# Patient Record
Sex: FEMALE | Race: WHITE | Employment: FULL TIME | URBAN - METROPOLITAN AREA
[De-identification: names, ages, dates, MRNs, and addresses within clinical notes are randomized per-mention and may not be internally consistent; named-entity substitution may affect disease eponyms.]

---

## 2018-05-21 PROBLEM — K21.00 GASTROESOPHAGEAL REFLUX DISEASE WITH ESOPHAGITIS: Status: ACTIVE | Noted: 2018-05-21

## 2020-07-30 ENCOUNTER — OFFICE VISIT (OUTPATIENT)
Dept: SURGERY | Facility: CLINIC | Age: 65
End: 2020-07-30
Payer: COMMERCIAL

## 2020-07-30 VITALS
SYSTOLIC BLOOD PRESSURE: 124 MMHG | HEIGHT: 69 IN | WEIGHT: 293 LBS | DIASTOLIC BLOOD PRESSURE: 76 MMHG | BODY MASS INDEX: 43.4 KG/M2 | HEART RATE: 72 BPM

## 2020-07-30 DIAGNOSIS — R73.03 PREDIABETES: ICD-10-CM

## 2020-07-30 DIAGNOSIS — R63.5 WEIGHT GAIN: Primary | ICD-10-CM

## 2020-07-30 DIAGNOSIS — R60.0 BILATERAL LOWER EXTREMITY EDEMA: ICD-10-CM

## 2020-07-30 DIAGNOSIS — F32.5 DEPRESSION, MAJOR, IN REMISSION (HCC): ICD-10-CM

## 2020-07-30 DIAGNOSIS — K21.00 GASTROESOPHAGEAL REFLUX DISEASE WITH ESOPHAGITIS: ICD-10-CM

## 2020-07-30 DIAGNOSIS — Z86.69 HX OF MIGRAINE HEADACHES: ICD-10-CM

## 2020-07-30 DIAGNOSIS — E66.01 MORBID OBESITY WITH BMI OF 45.0-49.9, ADULT (HCC): ICD-10-CM

## 2020-07-30 PROCEDURE — 3008F BODY MASS INDEX DOCD: CPT | Performed by: NURSE PRACTITIONER

## 2020-07-30 PROCEDURE — 3078F DIAST BP <80 MM HG: CPT | Performed by: NURSE PRACTITIONER

## 2020-07-30 PROCEDURE — 99204 OFFICE O/P NEW MOD 45 MIN: CPT | Performed by: NURSE PRACTITIONER

## 2020-07-30 PROCEDURE — 3074F SYST BP LT 130 MM HG: CPT | Performed by: NURSE PRACTITIONER

## 2020-07-30 RX ORDER — HYDROCHLOROTHIAZIDE 12.5 MG/1
12.5 TABLET ORAL DAILY
Qty: 30 TABLET | Refills: 1 | Status: SHIPPED | OUTPATIENT
Start: 2020-07-30 | End: 2020-08-21

## 2020-07-30 NOTE — PATIENT INSTRUCTIONS
Check on insurance coverage for weight loss medications/dietican:  Qsymia, Contrave, Saxenda, Phentermine, Vyvanse, and dietician benefits     Clean 15   Dirty Dozen       1. Do a house cleanse, remove unhealthy foods from the house.    2. Aim to eat a wh later.    Start probiotic, 1 MD. On Camila Cocks. Eat meals between 7 am and 7 pm.     Return to clinic in one month.

## 2020-07-30 NOTE — PROGRESS NOTES
[facility] Consultation Note       Date of Consult:  2020    Patient:  Jose Guadalupe Chowdhury  :      1955  MRN:      [id number]    Referring Provider: Dr. Vivek Godoy       Chief Complaint:  Patient presents with:  Consult  Weight Man Medications:    Current Outpatient Medications   Medication Sig Dispense Refill   • hydrochlorothiazide 12.5 MG Oral Tab Take 1 tablet (12.5 mg total) by mouth daily.  30 tablet 1   • escitalopram 20 MG Oral Tab Take 1 tablet (20 mg total) by mouth once carmita together: Not on file        Attends Taoist service: Not on file        Active member of club or organization: Not on file        Attends meetings of clubs or organizations: Not on file        Relationship status: Not on file      Intimate partner viole daily- snack foods   Emotional: +  Depression: + on lexapro   Grazing: +  Sweet tooth: +  Crunchy/salty: +  Etoh: Very little     Soda Drinker: Yes  If yes, how much?: Diet coke, 1 can/day   Sports Drinks:  No  Juice:  No      Average daily fruit/vegetable symmetric  Skin: Skin color, texture, turgor normal. No rashes or lesions  Neurologic: Grossly normal    ASSESSMENT     GERD:  The patient states her acid reflux has been well controlled with her current medication.   No symptoms of heartburn or indigestion month: 1. Keep a food log, aim for 100 grams carbs/day. Meet with RD.   2. Drink 64 ounces of non-caloric beverages per day. No fruit juices or regular soda. 3. Aim for 150 minutes moderate exercise per week.     4. Increase fruit and vegetable servings t

## 2020-08-21 RX ORDER — HYDROCHLOROTHIAZIDE 12.5 MG/1
TABLET ORAL
Qty: 30 TABLET | Refills: 1 | Status: SHIPPED | OUTPATIENT
Start: 2020-08-21 | End: 2020-08-31

## 2020-08-26 ENCOUNTER — OFFICE VISIT (OUTPATIENT)
Dept: SURGERY | Facility: CLINIC | Age: 65
End: 2020-08-26
Payer: COMMERCIAL

## 2020-08-26 VITALS
SYSTOLIC BLOOD PRESSURE: 135 MMHG | HEART RATE: 69 BPM | DIASTOLIC BLOOD PRESSURE: 80 MMHG | WEIGHT: 293 LBS | HEIGHT: 69 IN | BODY MASS INDEX: 43.4 KG/M2 | OXYGEN SATURATION: 99 %

## 2020-08-26 DIAGNOSIS — Z51.81 ENCOUNTER FOR THERAPEUTIC DRUG MONITORING: Primary | ICD-10-CM

## 2020-08-26 DIAGNOSIS — K21.00 GASTROESOPHAGEAL REFLUX DISEASE WITH ESOPHAGITIS: ICD-10-CM

## 2020-08-26 DIAGNOSIS — E66.01 MORBID OBESITY WITH BMI OF 45.0-49.9, ADULT (HCC): ICD-10-CM

## 2020-08-26 DIAGNOSIS — Z86.69 HX OF MIGRAINE HEADACHES: ICD-10-CM

## 2020-08-26 DIAGNOSIS — F32.5 DEPRESSION, MAJOR, IN REMISSION (HCC): ICD-10-CM

## 2020-08-26 DIAGNOSIS — R60.0 BILATERAL LOWER EXTREMITY EDEMA: ICD-10-CM

## 2020-08-26 DIAGNOSIS — R73.03 PREDIABETES: ICD-10-CM

## 2020-08-26 PROCEDURE — 99214 OFFICE O/P EST MOD 30 MIN: CPT | Performed by: NURSE PRACTITIONER

## 2020-08-26 PROCEDURE — 3008F BODY MASS INDEX DOCD: CPT | Performed by: NURSE PRACTITIONER

## 2020-08-26 PROCEDURE — 3075F SYST BP GE 130 - 139MM HG: CPT | Performed by: NURSE PRACTITIONER

## 2020-08-26 PROCEDURE — 3079F DIAST BP 80-89 MM HG: CPT | Performed by: NURSE PRACTITIONER

## 2020-08-26 RX ORDER — LIRAGLUTIDE 6 MG/ML
3 INJECTION, SOLUTION SUBCUTANEOUS DAILY
Qty: 5 PEN | Refills: 1 | Status: SHIPPED | OUTPATIENT
Start: 2020-08-26 | End: 2021-06-18

## 2020-08-26 RX ORDER — BLOOD SUGAR DIAGNOSTIC
1 STRIP MISCELLANEOUS DAILY
Qty: 30 EACH | Refills: 0 | Status: SHIPPED | OUTPATIENT
Start: 2020-08-26

## 2020-08-26 NOTE — PROGRESS NOTES
[address] [facility] [address] [facility]  Dept: [phone]       Patient:  Addie Su  :      1955  MRN:      [id number]    Chief Complaint:  Patient present (1.753 m)   Wt (!) 316 lb 4.8 oz (143.5 kg)   SpO2 99%   BMI 46.71 kg/m²     Initial weight loss: -07   Total weight loss: -07   Start weight: 323    Wt Readings from Last 6 Encounters:  08/26/20 : (!) 316 lb 4.8 oz (143.5 kg)  07/30/20 : (!) 323 lb (146.5 Smokeless tobacco: Never Used    Substance and Sexual Activity      Alcohol use:  Yes        Alcohol/week: 0.0 standard drinks        Comment: socially      Drug use: No      Sexual activity: Not on file    Lifestyle      Physical activity:        Days per • Hypertension Brother         X2   • High Cholesterol Sister      Initial Intake:   After dinner behavior: cookies, candy  Night eating: -  Portion sizes: +  Binge: BED 7+ (often), daily- snack foods   Emotional: +  Depression: + on lexapro   Grazing: + dyspnea on exertion  Cardiovascular: negative  Gastrointestinal: positive for reflux symptoms  Integument: negative  Hematologic/lymphatic: negative  Musculoskeletal:positive for arthralgias and back pain  Neurological: positive for headaches and migraines Recommend dietary changes and lifestyle modifications as discussed below.  Monitor.      OBESITY/WEIGHT GAIN:  PREDIABETES:     Patient's goal weight: 190 lbs   Values:  Health, Lifestyle, Traveling, Being able to do what I like to do      Recommended lucio prn.     Continue topiramate 50 mg in the evening.     Continue probiotic.      Meet with RD.     Continue IF 12 hours.     Start NOOM with Saxenda. Jump Start.      RTC one month.      ESA Cruz

## 2020-08-26 NOTE — PATIENT INSTRUCTIONS
Start Saxenda 0.6 mg SC daily x 1 week; increase by 0.6 mg/week until dose of 3 mg SC daily. BLE Edema: Continue HCTZ 12.5 mg prn.     Continue topiramate 50 mg in the evening.     Continue probiotic.      Meet with RD.     IF 12 hours.     Jump Start.

## 2020-08-31 DIAGNOSIS — Z51.81 ENCOUNTER FOR THERAPEUTIC DRUG MONITORING: Primary | ICD-10-CM

## 2020-08-31 DIAGNOSIS — Z86.69 HX OF MIGRAINE HEADACHES: ICD-10-CM

## 2020-08-31 DIAGNOSIS — F32.5 DEPRESSION, MAJOR, IN REMISSION (HCC): ICD-10-CM

## 2020-08-31 DIAGNOSIS — R60.0 BILATERAL LOWER EXTREMITY EDEMA: ICD-10-CM

## 2020-08-31 DIAGNOSIS — R73.03 PREDIABETES: ICD-10-CM

## 2020-08-31 DIAGNOSIS — E66.01 MORBID OBESITY WITH BMI OF 45.0-49.9, ADULT (HCC): ICD-10-CM

## 2020-08-31 DIAGNOSIS — K21.00 GASTROESOPHAGEAL REFLUX DISEASE WITH ESOPHAGITIS: ICD-10-CM

## 2020-08-31 RX ORDER — HYDROCHLOROTHIAZIDE 12.5 MG/1
12.5 TABLET ORAL DAILY
Qty: 90 TABLET | Refills: 0 | Status: SHIPPED | OUTPATIENT
Start: 2020-08-31

## 2020-09-23 ENCOUNTER — OFFICE VISIT (OUTPATIENT)
Dept: SURGERY | Facility: CLINIC | Age: 65
End: 2020-09-23
Payer: COMMERCIAL

## 2020-09-23 VITALS — HEIGHT: 69 IN | BODY MASS INDEX: 43.4 KG/M2 | WEIGHT: 293 LBS

## 2020-09-23 DIAGNOSIS — E66.01 CLASS 3 SEVERE OBESITY DUE TO EXCESS CALORIES WITH SERIOUS COMORBIDITY AND BODY MASS INDEX (BMI) OF 45.0 TO 49.9 IN ADULT (HCC): Primary | ICD-10-CM

## 2020-09-23 PROCEDURE — 97802 MEDICAL NUTRITION INDIV IN: CPT | Performed by: DIETITIAN, REGISTERED

## 2020-09-23 PROCEDURE — 3008F BODY MASS INDEX DOCD: CPT | Performed by: DIETITIAN, REGISTERED

## 2020-09-23 NOTE — PROGRESS NOTES
INITIAL OUTPATIENT NUTRITION CONSULTATION    Nutrition Assessment    Medical Diagnosis: Obesity    Physical Findings: None reported    Client Age and Gender: 60year old female    Marital Status and Occupation: , works for [employer]: HDL Cholesterol   Date Value Ref Range Status   07/03/2012 61 >39 mg/dL Final     Comment:     According to ATP-III Guidelines, HDL-C >59 mg/dL is considered a  negative risk factor for CHD.      Direct HDL   Date Value Ref Range Status   08/12/2019 48 candy bar)  Beverages: Water, 48 oz/day + diet coke x1-2/day    Meal pattern: 2-3 meals/d, 1-2 snacks/d    Number of meals/week eaten at restaurants: 4-5        Alcohol Intake: 0 ozs/wk    Diet Quality: Moderate    Estimated current caloric intake: ~1650 c

## 2020-09-30 ENCOUNTER — OFFICE VISIT (OUTPATIENT)
Dept: SURGERY | Facility: CLINIC | Age: 65
End: 2020-09-30
Payer: COMMERCIAL

## 2020-09-30 VITALS
DIASTOLIC BLOOD PRESSURE: 76 MMHG | OXYGEN SATURATION: 98 % | HEART RATE: 81 BPM | WEIGHT: 293 LBS | HEIGHT: 69 IN | BODY MASS INDEX: 43.4 KG/M2 | SYSTOLIC BLOOD PRESSURE: 120 MMHG

## 2020-09-30 DIAGNOSIS — E66.01 MORBID OBESITY WITH BMI OF 45.0-49.9, ADULT (HCC): ICD-10-CM

## 2020-09-30 DIAGNOSIS — Z51.81 ENCOUNTER FOR THERAPEUTIC DRUG MONITORING: Primary | ICD-10-CM

## 2020-09-30 DIAGNOSIS — R73.03 PREDIABETES: ICD-10-CM

## 2020-09-30 DIAGNOSIS — K21.00 GASTROESOPHAGEAL REFLUX DISEASE WITH ESOPHAGITIS: ICD-10-CM

## 2020-09-30 DIAGNOSIS — F32.5 DEPRESSION, MAJOR, IN REMISSION (HCC): ICD-10-CM

## 2020-09-30 DIAGNOSIS — Z86.69 HX OF MIGRAINE HEADACHES: ICD-10-CM

## 2020-09-30 DIAGNOSIS — R60.0 BILATERAL LOWER EXTREMITY EDEMA: ICD-10-CM

## 2020-09-30 PROCEDURE — 3008F BODY MASS INDEX DOCD: CPT | Performed by: NURSE PRACTITIONER

## 2020-09-30 PROCEDURE — 3074F SYST BP LT 130 MM HG: CPT | Performed by: NURSE PRACTITIONER

## 2020-09-30 PROCEDURE — 99214 OFFICE O/P EST MOD 30 MIN: CPT | Performed by: NURSE PRACTITIONER

## 2020-09-30 PROCEDURE — 3078F DIAST BP <80 MM HG: CPT | Performed by: NURSE PRACTITIONER

## 2020-09-30 NOTE — PROGRESS NOTES
[address] [facility] [address] [facility]  Dept: [phone]       Patient:  Hyun Gonzalez  :      1955  MRN:      [id number]    Chief Complaint:  Weight Manageme lb 3.2 oz (139.3 kg)   SpO2 98%   BMI 45.37 kg/m²     Initial weight loss: -09   Total weight loss: -16   Start weight: 323    Wt Readings from Last 6 Encounters:  09/30/20 : (!) 307 lb 3.2 oz (139.3 kg)  09/23/20 : (!) 309 lb 14.4 oz (140.6 kg)  08/26/20 Not on file        Inability: Not on file      Transportation needs        Medical: Not on file        Non-medical: Not on file    Tobacco Use      Smoking status: Never Smoker      Smokeless tobacco: Never Used    Substance and Sexual Activity      Alcoho prostate/Lymphoma   • Cancer Mother         lymphoma   • Stroke Mother    • High Blood Pressure Mother    • High Cholesterol Mother    • Other (Osteoporosis) Mother    • Hypertension Sister    • Hypertension Brother         X2   • High Cholesterol Sister and Eat slowly and take 20 to 30 minutes to complete each meal    Exercise Goals Reviewed and Discussed    Other:  Continue current plan     ROS:    Constitutional: negative  Respiratory: positive for dyspnea on exertion, improved  Cardiovascular: negative remission ([facility].)    Prediabetes    Hx of migraine headaches    Bilateral lower extremity edema    Gastroesophageal reflux disease with esophagitis    Morbid obesity with BMI of 45.0-49.9, adult ([facility].)      GERD: Recommend dietary changes and lifestyle modifi mg SC daily reached. SQ administration education provided to patient at time of visit.    Discussed risks, benefits, and side effects of medication with hand-out provided.      BLE Edema: Continue HCTZ 12.5 mg prn.     Continue topiramate 50 mg in the even

## 2020-10-20 RX ORDER — TOPIRAMATE 50 MG/1
50 TABLET, FILM COATED ORAL 2 TIMES DAILY
Qty: 60 TABLET | Refills: 3 | Status: SHIPPED | OUTPATIENT
Start: 2020-10-20 | End: 2021-06-18

## 2021-01-11 RX ORDER — TOPIRAMATE 50 MG/1
TABLET, FILM COATED ORAL
Qty: 180 TABLET | Refills: 1 | OUTPATIENT
Start: 2021-01-11

## 2021-04-09 DIAGNOSIS — Z23 NEED FOR VACCINATION: ICD-10-CM

## 2022-02-14 PROBLEM — R63.5 WEIGHT GAIN: Status: RESOLVED | Noted: 2020-07-30 | Resolved: 2022-02-14

## 2022-02-14 PROBLEM — R60.0 BILATERAL LOWER EXTREMITY EDEMA: Status: RESOLVED | Noted: 2020-07-30 | Resolved: 2022-02-14

## 2022-02-14 PROBLEM — N39.0 URINARY TRACT INFECTION WITHOUT HEMATURIA, SITE UNSPECIFIED: Status: ACTIVE | Noted: 2022-02-14

## 2022-02-14 PROBLEM — Z51.81 ENCOUNTER FOR THERAPEUTIC DRUG MONITORING: Status: RESOLVED | Noted: 2020-08-26 | Resolved: 2022-02-14

## 2022-02-14 PROBLEM — Z86.69 HX OF MIGRAINE HEADACHES: Status: RESOLVED | Noted: 2020-07-30 | Resolved: 2022-02-14

## 2022-05-17 ENCOUNTER — HOSPITAL ENCOUNTER (OUTPATIENT)
Dept: CT IMAGING | Age: 67
Discharge: HOME | End: 2022-05-17
Attending: INTERNAL MEDICINE

## 2022-05-17 DIAGNOSIS — R10.11 ABDOMINAL PAIN, RIGHT UPPER QUADRANT: ICD-10-CM

## 2022-05-17 PROCEDURE — 74176 CT ABD & PELVIS W/O CONTRAST: CPT

## 2022-11-21 ENCOUNTER — APPOINTMENT (OUTPATIENT)
Dept: GASTROENTEROLOGY | Age: 67
End: 2022-11-21

## (undated) NOTE — Clinical Note
Noa,I saw Destiny Galvan in clinic today for weight loss/management. I have recommended intensive lifestyle/behavioral modifications for weight loss. In addition, I have recommended she start topiramate as ordered.  She will meet with our dietician if covered by h